# Patient Record
Sex: FEMALE | Race: WHITE | Employment: STUDENT | URBAN - METROPOLITAN AREA
[De-identification: names, ages, dates, MRNs, and addresses within clinical notes are randomized per-mention and may not be internally consistent; named-entity substitution may affect disease eponyms.]

---
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
## 2019-01-04 ENCOUNTER — OFFICE VISIT (OUTPATIENT)
Dept: PRIMARY CARE CLINIC | Age: 19
End: 2019-01-04

## 2019-01-04 VITALS
BODY MASS INDEX: 25.62 KG/M2 | OXYGEN SATURATION: 98 % | HEIGHT: 70 IN | WEIGHT: 179 LBS | SYSTOLIC BLOOD PRESSURE: 124 MMHG | HEART RATE: 80 BPM | RESPIRATION RATE: 18 BRPM | TEMPERATURE: 98.4 F | DIASTOLIC BLOOD PRESSURE: 77 MMHG

## 2019-01-04 DIAGNOSIS — H66.93 OTITIS OF BOTH EARS: ICD-10-CM

## 2019-01-04 DIAGNOSIS — J02.9 SORE THROAT: Primary | ICD-10-CM

## 2019-01-04 RX ORDER — HYDROCODONE BITARTRATE AND ACETAMINOPHEN 5; 325 MG/1; MG/1
TABLET ORAL
Refills: 0 | COMMUNITY
Start: 2018-09-28 | End: 2019-01-04 | Stop reason: ALTCHOICE

## 2019-01-04 RX ORDER — AMOXICILLIN 875 MG/1
TABLET, FILM COATED ORAL
Refills: 0 | COMMUNITY
Start: 2018-09-28 | End: 2019-01-04 | Stop reason: ALTCHOICE

## 2019-01-04 RX ORDER — AMOXICILLIN 500 MG/1
500 CAPSULE ORAL 2 TIMES DAILY
Qty: 20 CAP | Refills: 0 | Status: SHIPPED | OUTPATIENT
Start: 2019-01-04 | End: 2019-01-14

## 2019-01-04 NOTE — PATIENT INSTRUCTIONS
Tonsillitis: Care Instructions Your Care Instructions Tonsillitis is an infection of the tonsils that is caused by bacteria or a virus. The tonsils are in the back of the throat and are part of the immune system. Tonsillitis typically lasts from a few days up to a couple of weeks. Tonsillitis caused by a virus goes away on its own. Tonsillitis caused by the bacteria that causes strep throat is treated with antibiotics. You and your doctor may consider surgery to remove the tonsils (tonsillectomy) if you have serious complications or repeat infections. Follow-up care is a key part of your treatment and safety. Be sure to make and go to all appointments, and call your doctor if you are having problems. It's also a good idea to know your test results and keep a list of the medicines you take. How can you care for yourself at home? · If your doctor prescribed antibiotics, take them as directed. Do not stop taking them just because you feel better. You need to take the full course of antibiotics. · Gargle with warm salt water. This helps reduce swelling and relieve discomfort. Gargle once an hour with 1 teaspoon of salt mixed in 8 fluid ounces of warm water. · Take an over-the-counter pain medicine, such as acetaminophen (Tylenol), ibuprofen (Advil, Motrin), or naproxen (Aleve). Be safe with medicines. Read and follow all instructions on the label. No one younger than 20 should take aspirin. It has been linked to Reye syndrome, a serious illness. · Be careful when taking over-the-counter cold or flu medicines and Tylenol at the same time. Many of these medicines have acetaminophen, which is Tylenol. Read the labels to make sure that you are not taking more than the recommended dose. Too much acetaminophen (Tylenol) can be harmful. · Try an over-the-counter throat spray to relieve throat pain. · Drink plenty of fluids. Fluids may help soothe an irritated throat. Drink warm or cool liquids (whichever feels better). These include tea, soup, and juice. · Do not smoke, and avoid secondhand smoke. Smoking can make tonsillitis worse. If you need help quitting, talk to your doctor about stop-smoking programs and medicines. These can increase your chances of quitting for good. · Use a vaporizer or humidifier to add moisture to your bedroom. Follow the directions for cleaning the machine. When should you call for help? Call your doctor now or seek immediate medical care if: 
  · Your pain gets worse on one side of your throat.  
  · You have a new or higher fever.  
  · You notice changes in your voice.  
  · You have trouble opening your mouth.  
  · You have any trouble breathing.  
  · You have much more trouble swallowing.  
  · You have a fever with a stiff neck or a severe headache.  
  · You are sensitive to light or feel very sleepy or confused.  
 Watch closely for changes in your health, and be sure to contact your doctor if: 
  · You do not get better after 2 days. Where can you learn more? Go to http://ap-rudolph.info/. Enter W235 in the search box to learn more about \"Tonsillitis: Care Instructions. \" Current as of: March 28, 2018 Content Version: 11.8 © 5872-0860 [employer]. Care instructions adapted under license by [employer] (which disclaims liability or warranty for this information). If you have questions about a medical condition or this instruction, always ask your healthcare professional. [place] [address] any warranty or liability for your use of this information.

## 2019-01-04 NOTE — PROGRESS NOTES
Chief Complaint Patient presents with  Red Eye  
pt c/o L eye redness and irritation x 2-3 days also c/o cough and congestion x 2-3 days, states she has taken ibuprofen to help with discomfort. This note will not be viewable in [address]

## 2019-01-05 NOTE — PROGRESS NOTES
Subjective:  
  
Winifred Hughes is a 22 y.o. female who presents for possible ear infection. Symptoms include bilateral ear pain, bilateral ear drainage , plugged sensation in bilateral ear, congestion, coryza and sore throat. Onset of symptoms was 5 days ago, gradually worsening since that time. Associated symptoms include bilateral ear pressure/pain, achiness, congestion and coryza, which have been present for 5 days . She is drinking plenty of fluids. Past Medical History:  
Diagnosis Date  Alcohol abuse  Anxiety and depression Current Outpatient Medications Medication Sig Dispense Refill  amoxicillin (AMOXIL) 500 mg capsule Take 1 Cap by mouth two (2) times a day for 10 days. 20 Cap 0  
 PARoxetine (PAXIL) 20 mg tablet TAKE 1 TABLET BY MOUTH DAILY 30 Tab 2  JUNEL 1/20, 21, 1-20 mg-mcg tab TAKE 1 TABLET BY MOUTH DAILY 30 Dose Pack 3 No Known Allergies Social History Socioeconomic History  Marital status: SINGLE Spouse name: Not on file  Number of children: Not on file  Years of education: Not on file  Highest education level: Not on file Social Needs  Financial resource strain: Not on file  Food insecurity - worry: Not on file  Food insecurity - inability: Not on file  Transportation needs - medical: Not on file  Transportation needs - non-medical: Not on file Occupational History  Not on file Tobacco Use  Smoking status: Former Smoker Packs/day: 0.25 Types: Cigarettes Last attempt to quit: 6/26/2015 Years since quitting: 3.5  Smokeless tobacco: Never Used Substance and Sexual Activity  Alcohol use: Yes Comment: \"social\", constitutes abuse  Drug use: No  
 Sexual activity: Yes  
  Partners: Male Birth control/protection: Pill Other Topics Concern  Not on file Social History Narrative The patient is single, never , last bf was 3 mo ago.   The patient lives with a parent. The patient has no children. The patient does plan to return home upon discharge. HS grad. Ticket for underage etoh possession 2 weeks ago. The patient's source of income comes from family.  The patient has not been in an event described as horrible or outside the realm of ordinary life experience either currently or in the past. The patient has not been a victim of sexual/physical abuse.  The highest grade achieved is 12th. Review of Systems A comprehensive review of systems was negative except for that written in the HPI. Objective:  
 
Visit Vitals /77 (BP 1 Location: Left arm, BP Patient Position: Sitting) Pulse 80 Temp 98.4 °F (36.9 °C) (Oral) Resp 18 Ht 5' 10\" (1.778 m) Wt 179 lb (81.2 kg) SpO2 98% BMI 25.68 kg/m² General:  alert, cooperative, no distress, appears stated age Head:  NCAT w/o lesions or tenderness Eyes: negative, conjunctivae/corneas clear. PERRL, EOM's intact. Fundi benign Right Ear: right TM erythematous, bulging Left Ear: left TM erythematous, bulging Mouth:  Lips, mucosa, and tongue normal. Teeth and gums normal  
Neck: supple, symmetrical, trachea midline, no adenopathy, thyroid: not enlarged, symmetric, no tenderness/mass/nodules, no carotid bruit and no JVD. Lungs: clear to auscultation bilaterally Heart:  regular rate and rhythm, S1, S2 normal, no murmur, click, rub or gallop Skin: Normal.  
    
Assessment/Plan:  
 
Acute bilateral otitis media 1. Treatment:  Amoxicillin 2. OTC meds (acetaminophen, ibuprofen- doses discussed), fluids, rest, avoid carbonated/ alcoholic, and caffeinated beverages. 3.  Follow up with PCP in 1 week if not improving. ICD-10-CM ICD-9-CM 1. Sore throat J02.9 462 amoxicillin (AMOXIL) 500 mg capsule 2. Otitis of both ears H66.93 382.9 Saira Lester

## 2019-01-14 DIAGNOSIS — F41.9 ANXIETY: ICD-10-CM

## 2019-01-14 DIAGNOSIS — F32.A DEPRESSION, UNSPECIFIED DEPRESSION TYPE: ICD-10-CM

## 2019-01-14 RX ORDER — PAROXETINE HYDROCHLORIDE 20 MG/1
TABLET, FILM COATED ORAL
Qty: 30 TAB | Refills: 2 | Status: SHIPPED | OUTPATIENT
Start: 2019-01-14 | End: 2019-04-11 | Stop reason: ALTCHOICE

## 2019-01-14 NOTE — TELEPHONE ENCOUNTER
PCP: Glenn Rudd MD    Last appt: 2/26/2018  No future appointments.     Requested Prescriptions     Pending Prescriptions Disp Refills    PARoxetine (PAXIL) 20 mg tablet 30 Tab 2     Sig: TAKE 1 TABLET BY MOUTH DAILY

## 2019-04-11 ENCOUNTER — OFFICE VISIT (OUTPATIENT)
Dept: INTERNAL MEDICINE CLINIC | Age: 19
End: 2019-04-11

## 2019-04-11 VITALS
OXYGEN SATURATION: 99 % | WEIGHT: 179 LBS | RESPIRATION RATE: 18 BRPM | SYSTOLIC BLOOD PRESSURE: 134 MMHG | HEART RATE: 77 BPM | BODY MASS INDEX: 25.62 KG/M2 | TEMPERATURE: 97.8 F | HEIGHT: 70 IN | DIASTOLIC BLOOD PRESSURE: 84 MMHG

## 2019-04-11 DIAGNOSIS — F41.9 ANXIETY: Primary | ICD-10-CM

## 2019-04-11 DIAGNOSIS — K52.29 GASTROINTESTINAL FOOD ALLERGY: ICD-10-CM

## 2019-04-11 DIAGNOSIS — R10.84 GENERALIZED ABDOMINAL PAIN: ICD-10-CM

## 2019-04-11 DIAGNOSIS — L23.9 ALLERGIC CONTACT DERMATITIS, UNSPECIFIED TRIGGER: ICD-10-CM

## 2019-04-11 RX ORDER — HYDROCODONE BITARTRATE AND ACETAMINOPHEN 5; 325 MG/1; MG/1
TABLET ORAL
COMMUNITY
Start: 2019-04-09 | End: 2019-04-11 | Stop reason: ALTCHOICE

## 2019-04-11 RX ORDER — AMOXICILLIN 875 MG/1
TABLET, FILM COATED ORAL
COMMUNITY
Start: 2019-04-09 | End: 2019-11-21

## 2019-04-11 RX ORDER — PAROXETINE 30 MG/1
30 TABLET, FILM COATED ORAL DAILY
Qty: 30 TAB | Refills: 5 | Status: SHIPPED | OUTPATIENT
Start: 2019-04-11 | End: 2019-05-09 | Stop reason: SDUPTHER

## 2019-04-11 RX ORDER — CHLORHEXIDINE GLUCONATE 1.2 MG/ML
RINSE ORAL
COMMUNITY
Start: 2019-04-09 | End: 2019-11-21

## 2019-04-11 RX ORDER — TRIAMCINOLONE ACETONIDE 0.25 MG/G
CREAM TOPICAL 2 TIMES DAILY
Qty: 15 G | Refills: 0 | Status: SHIPPED | OUTPATIENT
Start: 2019-04-11 | End: 2019-11-21

## 2019-04-11 NOTE — PROGRESS NOTES
Reviewed record in preparation for visit and have obtained necessary documentation. Identified pt with two pt identifiers(name and ). Chief Complaint   Patient presents with    Medication Evaluation    Anxiety    Abdominal Pain       Health Maintenance Due   Topic Date Due    Pneumococcal Vaccine (1 of 1 - PPSV23) 2005    HPV Vaccine (1 - Female 3-dose series) 2014    DTaP/Tdap/Td  (2 - Td) 2017       Ms. Natividad Vega has a reminder for a \"due or due soon\" health maintenance. I have asked that she discuss this further with her primary care provider for follow-up on this health maintenance. Coordination of Care Questionnaire:  :     1) Have you been to an emergency room, urgent care clinic since your last visit? no   Hospitalized since your last visit? no             2) Have you seen or consulted any other health care providers outside of [address] [place] [address] since your last visit? no  (Include any pap smears or colon screenings in this section.)    3) In the event something were to happen to you and you were unable to speak on your behalf, do you have an Advance Directive/ Living Will in place stating your wishes? NO    Do you have an Advance Directive on file? no    4) Are you interested in receiving information on Advance Directives? NO    Patient is accompanied by boyfriend I have received verbal consent from [facility] to discuss any/all medical information while they are present in the room.

## 2019-04-11 NOTE — PROGRESS NOTES
Ms. Theresa Ferguson is presenting to follow up      CC:  Medication Evaluation; Anxiety; and Abdominal Pain       HPI:  She has a history of Anxiety and depression   Borderline personality personality  Patient felt paxil was very helpful initially but now wearing off. Denies side effects to the medication  -Sleeps well  Denies current anhedonia,and sad feelings     Patient had a psychiatrist in the past , has been through therapy \" that was not very helpful\"     Lower abdominal discomfort with loose stools after meals: she wonders if has food allergy. Gynecologist: none current  No pap smear  Sexually active 1 partner/ male      Social Hx  Aocohol intake: drinks once a week. Denies daily alcohol use  Denies drug use  Denies tobacco use  Quit tobacco  2015    Review of systems:  Constitutional: negative for fever, chills, weight loss, night sweats   10 systems reviewed and negative other than HPI      Past Medical History:   Diagnosis Date    Alcohol abuse     Anxiety and depression         History reviewed. No pertinent surgical history. No Known Allergies    Current Outpatient Medications on File Prior to Visit   Medication Sig Dispense Refill    amoxicillin (AMOXIL) 875 mg tablet       chlorhexidine (PERIDEX) 0.12 % solution       HYDROcodone-acetaminophen (NORCO) 5-325 mg per tablet       PARoxetine (PAXIL) 20 mg tablet TAKE 1 TABLET BY MOUTH DAILY 30 Tab 2    JUNEL 1/20, 21, 1-20 mg-mcg tab TAKE 1 TABLET BY MOUTH DAILY 30 Dose Pack 3     No current facility-administered medications on file prior to visit. family history includes Alcohol abuse in her father; Depression in her father and mother; Diabetes in her maternal grandmother; Heart Disease in her maternal grandfather and maternal grandmother.     Social History     Socioeconomic History    Marital status: SINGLE     Spouse name: Not on file    Number of children: Not on file    Years of education: Not on file    Highest education level: Not on file   Occupational History    Not on file   Social Needs    Financial resource strain: Not on file    Food insecurity:     Worry: Not on file     Inability: Not on file    Transportation needs:     Medical: Not on file     Non-medical: Not on file   Tobacco Use    Smoking status: Former Smoker     Packs/day: 0.25     Types: Cigarettes     Last attempt to quit: 6/26/2015     Years since quitting: 3.7    Smokeless tobacco: Never Used   Substance and Sexual Activity    Alcohol use: Yes     Comment: \"social\", constitutes abuse    Drug use: No    Sexual activity: Yes     Partners: Male     Birth control/protection: Pill   Lifestyle    Physical activity:     Days per week: Not on file     Minutes per session: Not on file    Stress: Not on file   Relationships    Social connections:     Talks on phone: Not on file     Gets together: Not on file     Attends Am[employer] service: Not on file     Active member of club or organization: Not on file     Attends meetings of clubs or organizations: Not on file     Relationship status: Not on file    Intimate partner violence:     Fear of current or ex partner: Not on file     Emotionally abused: Not on file     Physically abused: Not on file     Forced sexual activity: Not on file   Other Topics Concern    Not on file   Social History Narrative    The patient is single, never , last bf was 3 mo ago. The patient lives with a parent. The patient has no children. The patient does plan to return home upon discharge. HS grad. Ticket for underage etoh possession 2 weeks ago. The patient's source of income comes from family.  The patient has not been in an event described as horrible or outside the realm of ordinary life experience either currently or in the past. The patient has not been a victim of sexual/physical abuse.  The highest grade achieved is 12th.         Visit Vitals  /84 (BP 1 Location: Right arm, BP Patient Position: Sitting)   Pulse 77   Temp 97.8 °F (36.6 °C) (Oral)   Resp 18   Ht 5' 10\" (1.778 m)   Wt 179 lb (81.2 kg)   SpO2 99%   BMI 25.68 kg/m²     General:  Well appearing female no acute distress  HEENT:   PERRL,normal conjunctiva. External ear and canals normal, TMs normal.  Hearing normal to voice. Nose without edema or discharge, normal septum. Lips, teeth, gums normal.  Oropharynx: no erythema, no exudates, no lesions, normal tongue. Neck:  Supple. Thyroid normal size, nontender, without nodules. No carotid bruit. No masses or lymphadenopathy  Respiratory: no respiratory distress,  no wheezing, no rhonchi, no rales. No chest wall tenderness. Cardiovascular:  RRR, normal S1S2, no murmur. Gastrointestinal: normal bowel sounds, soft, nontender, without masses. No hepatosplenomegaly. Extremities +2 pulses, no edema, normal sensation   Musculoskeletal:  Normal gait. Normal digits and nails. Normal strength and tone, no atrophy, and no abnormal movement. Skin: Has tattoos  Neuro:  A and OX4, fluent speech, cranial nerves normal 2-12. Sensation normal to light touch.   DTR symmetrical  Psych:  Normal affect      Lab Results   Component Value Date/Time    WBC 7.0 02/26/2018 10:34 AM    HGB 12.1 02/26/2018 10:34 AM    HCT 37.2 02/26/2018 10:34 AM    PLATELET 832 10/12/5020 10:34 AM    MCV 98 (H) 02/26/2018 10:34 AM     Lab Results   Component Value Date/Time    Sodium 140 02/26/2018 10:34 AM    Potassium 4.3 02/26/2018 10:34 AM    Chloride 103 02/26/2018 10:34 AM    CO2 20 02/26/2018 10:34 AM    Anion gap 3 (L) 07/30/2017 11:30 PM    Glucose 85 02/26/2018 10:34 AM    BUN 8 02/26/2018 10:34 AM    Creatinine 0.74 02/26/2018 10:34 AM    BUN/Creatinine ratio 11 02/26/2018 10:34 AM    GFR est  02/26/2018 10:34 AM    GFR est non- 02/26/2018 10:34 AM    Calcium 9.1 02/26/2018 10:34 AM     Lab Results   Component Value Date/Time    Cholesterol, total 133 08/01/2017 04:47 AM    HDL Cholesterol 48 08/01/2017 04:47 AM    LDL, calculated 57.6 08/01/2017 04:47 AM    VLDL, calculated 27.4 08/01/2017 04:47 AM    Triglyceride 137 08/01/2017 04:47 AM    CHOL/HDL Ratio 2.8 08/01/2017 04:47 AM     Lab Results   Component Value Date/Time    TSH 1.840 02/26/2018 10:34 AM     No results found for: HBA1C, HGBE8, LHT8SIUH, YBN6NBRF, OKG2TOVL  No results found for: VITD3, XQVID2, XQVID3, XQVID, VD3RIA                Assessment and Plan:     1. Anxiety  2. Depression, unspecified depression type  3. Borderline personality disorder  No Suicidal ideation  Initially felt paxil was very helpful now effects are not as great, will increase to paxil 30   -  Start PARoxetine (PAXIL) 20 mg tablet; Take 1 Tab by mouth daily. Dispense: 30 Tab; Refill: 2    4. Allergic contact dermatitis, unspecified trigger - advised to avoid jewelry watches on irritated area  - triamcinolone acetonide (KENALOG) 0.025 % topical cream; Apply  to affected area two (2) times a day. use thin layer  Dispense: 15 g; Refill: 0    5. Possible  Gastrointestinal food allergy  - CELIAC ANTIBODY PROFILE  - Jessica See MD

## 2019-04-11 NOTE — PATIENT INSTRUCTIONS
Neck: Exercises  Your Care Instructions  Here are some examples of typical rehabilitation exercises for your condition. Start each exercise slowly. Ease off the exercise if you start to have pain. Your doctor or physical therapist will tell you when you can start these exercises and which ones will work best for you. How to do the exercises  Neck stretch    1. This stretch works best if you keep your shoulder down as you lean away from it. To help you remember to do this, start by relaxing your shoulders and lightly holding on to your thighs or your chair. 2. Tilt your head toward your shoulder and hold for 15 to 30 seconds. Let the weight of your head stretch your muscles. 3. If you would like a little added stretch, use your hand to gently and steadily pull your head toward your shoulder. For example, keeping your right shoulder down, lean your head to the left. 4. Repeat 2 to 4 times toward each shoulder. Diagonal neck stretch    1. Turn your head slightly toward the direction you will be stretching, and tilt your head diagonally toward your chest and hold for 15 to 30 seconds. 2. If you would like a little added stretch, use your hand to gently and steadily pull your head forward on the diagonal.  3. Repeat 2 to 4 times toward each side. Dorsal glide stretch    1. Sit or stand tall and look straight ahead. 2. Slowly tuck your chin as you glide your head backward over your body  3. Hold for a count of 6, and then relax for up to 10 seconds. 4. Repeat 8 to 12 times. Chest and shoulder stretch    1. Sit or stand tall and glide your head backward as in the dorsal glide stretch. 2. Raise both arms so that your hands are next to your ears. 3. Take a deep breath, and as you breathe out, lower your elbows down and behind your back. You will feel your shoulder blades slide down and together, and at the same time you will feel a stretch across your chest and the front of your shoulders.   4. Hold for about 6 seconds, and then relax for up to 10 seconds. 5. Repeat 8 to 12 times. Strengthening: Hands on head    1. Move your head backward, forward, and side to side against gentle pressure from your hands, holding each position for about 6 seconds. 2. Repeat 8 to 12 times. Follow-up care is a key part of your treatment and safety. Be sure to make and go to all appointments, and call your doctor if you are having problems. It's also a good idea to know your test results and keep a list of the medicines you take. Where can you learn more? Go to http://ap-rudolph.info/. Enter P975 in the search box to learn more about \"Neck: Exercises. \"  Current as of: September 20, 2018  Content Version: 11.9  © 1703-8667 [employer]. Care instructions adapted under license by [employer] (which disclaims liability or warranty for this information).  If you have questions about a medical condition or this instruction, always ask your healthcare professional. [address] any warranty or liability for your use of this information.    ---------------------------------------------------  Cut out dairy for two weeks and see if your symptoms go away

## 2019-04-16 LAB
ALBUMIN SERPL-MCNC: 4 G/DL (ref 3.5–5.5)
ALBUMIN/GLOB SERPL: 1.4 {RATIO} (ref 1.2–2.2)
ALP SERPL-CCNC: 66 IU/L (ref 39–117)
ALT SERPL-CCNC: 11 IU/L (ref 0–32)
AST SERPL-CCNC: 17 IU/L (ref 0–40)
BASOPHILS # BLD AUTO: 0 X10E3/UL (ref 0–0.2)
BASOPHILS NFR BLD AUTO: 0 %
BILIRUB SERPL-MCNC: 0.4 MG/DL (ref 0–1.2)
BUN SERPL-MCNC: 7 MG/DL (ref 6–20)
BUN/CREAT SERPL: 9 (ref 9–23)
CALCIUM SERPL-MCNC: 8.9 MG/DL (ref 8.7–10.2)
CHLORIDE SERPL-SCNC: 103 MMOL/L (ref 96–106)
CLAM IGE QN: <0.1 KU/L
CO2 SERPL-SCNC: 19 MMOL/L (ref 20–29)
CODFISH IGE QN: <0.1 KU/L
CORN IGE QN: <0.1 KU/L
COW MILK IGE QN: 0.19 KU/L
CREAT SERPL-MCNC: 0.78 MG/DL (ref 0.57–1)
EGG WHITE IGE QN: <0.1 KU/L
EOSINOPHIL # BLD AUTO: 0.2 X10E3/UL (ref 0–0.4)
EOSINOPHIL NFR BLD AUTO: 3 %
ERYTHROCYTE [DISTWIDTH] IN BLOOD BY AUTOMATED COUNT: 12.7 % (ref 12.3–15.4)
GLIADIN PEPTIDE IGA SER-ACNC: 3 UNITS (ref 0–19)
GLIADIN PEPTIDE IGG SER-ACNC: 3 UNITS (ref 0–19)
GLOBULIN SER CALC-MCNC: 2.9 G/DL (ref 1.5–4.5)
GLUCOSE SERPL-MCNC: 78 MG/DL (ref 65–99)
HCT VFR BLD AUTO: 39.8 % (ref 34–46.6)
HGB BLD-MCNC: 13 G/DL (ref 11.1–15.9)
IGA SERPL-MCNC: 189 MG/DL (ref 87–352)
IMM GRANULOCYTES # BLD AUTO: 0 X10E3/UL (ref 0–0.1)
IMM GRANULOCYTES NFR BLD AUTO: 0 %
LYMPHOCYTES # BLD AUTO: 2 X10E3/UL (ref 0.7–3.1)
LYMPHOCYTES NFR BLD AUTO: 24 %
Lab: ABNORMAL
MCH RBC QN AUTO: 31.8 PG (ref 26.6–33)
MCHC RBC AUTO-ENTMCNC: 32.7 G/DL (ref 31.5–35.7)
MCV RBC AUTO: 97 FL (ref 79–97)
MONOCYTES # BLD AUTO: 0.6 X10E3/UL (ref 0.1–0.9)
MONOCYTES NFR BLD AUTO: 7 %
NEUTROPHILS # BLD AUTO: 5.5 X10E3/UL (ref 1.4–7)
NEUTROPHILS NFR BLD AUTO: 66 %
PEANUT IGE QN: <0.1 KU/L
PLATELET # BLD AUTO: 244 X10E3/UL (ref 150–379)
POTASSIUM SERPL-SCNC: 4.1 MMOL/L (ref 3.5–5.2)
PROT SERPL-MCNC: 6.9 G/DL (ref 6–8.5)
RBC # BLD AUTO: 4.09 X10E6/UL (ref 3.77–5.28)
SCALLOP IGE QN: <0.1 KU/L
SESAME SEED IGE QN: <0.1 KU/L
SHRIMP IGE QN: <0.1 KU/L
SODIUM SERPL-SCNC: 137 MMOL/L (ref 134–144)
SOYBEAN IGE QN: <0.1 KU/L
TTG IGA SER-ACNC: <2 U/ML (ref 0–3)
TTG IGG SER-ACNC: <2 U/ML (ref 0–5)
WALNUT IGE QN: <0.1 KU/L
WBC # BLD AUTO: 8.4 X10E3/UL (ref 3.4–10.8)
WHEAT IGE QN: <0.1 KU/L

## 2019-04-17 NOTE — PROGRESS NOTES
Normal blood count  Normal kidney and liver function   Testing for celiac disease is negative  Mild allergy to milk. Avoid milk products.  Can drink almond milk instead

## 2019-04-29 DIAGNOSIS — N94.6 MENSES PAINFUL: ICD-10-CM

## 2019-04-29 RX ORDER — NORETHINDRONE ACETATE AND ETHINYL ESTRADIOL 1; 20 MG/1; UG/1
TABLET ORAL
Qty: 30 DOSE PACK | Refills: 3 | Status: SHIPPED | OUTPATIENT
Start: 2019-04-29 | End: 2019-08-01 | Stop reason: SDUPTHER

## 2019-04-29 NOTE — TELEPHONE ENCOUNTER
Pt would like to have a refill on Rx \"Junel\" (Family planning ) and send to the [facility]. Pt would like the nurse to call back when prescription request has been sent.  Pharmacy callback[de-identified] [phone] Pt's callback: [phone]       Message received & copied from [facility]

## 2019-04-29 NOTE — TELEPHONE ENCOUNTER
PCP: Rishabh Daniels MD    Last appt: 4/11/2019  No future appointments.     Requested Prescriptions     Pending Prescriptions Disp Refills    JUNEL 1/20, 21, 1-20 mg-mcg tab 30 Dose Pack 3     Sig: TAKE 1 TABLET BY MOUTH DAILY

## 2019-05-09 DIAGNOSIS — F41.9 ANXIETY: ICD-10-CM

## 2019-05-09 RX ORDER — PAROXETINE 30 MG/1
30 TABLET, FILM COATED ORAL DAILY
Qty: 30 TAB | Refills: 5 | Status: SHIPPED | OUTPATIENT
Start: 2019-05-09 | End: 2019-06-27 | Stop reason: SDUPTHER

## 2019-05-09 NOTE — TELEPHONE ENCOUNTER
PCP: Karina Pyle MD    Last appt: 4/11/2019  No future appointments. Requested Prescriptions     Pending Prescriptions Disp Refills    PARoxetine (PAXIL) 30 mg tablet 30 Tab 5     Sig: Take 1 Tab by mouth daily.

## 2019-06-27 DIAGNOSIS — F41.9 ANXIETY: ICD-10-CM

## 2019-06-27 NOTE — TELEPHONE ENCOUNTER
Requested Prescriptions     Pending Prescriptions Disp Refills    PARoxetine (PAXIL) 30 mg tablet 30 Tab 5     Sig: Take 1 Tab by mouth daily. PCP: Raul Sharma MD    Last appt: 4/11/2019  No future appointments. Requested Prescriptions     Pending Prescriptions Disp Refills    PARoxetine (PAXIL) 30 mg tablet 30 Tab 5     Sig: Take 1 Tab by mouth daily.

## 2019-06-28 DIAGNOSIS — F41.9 ANXIETY: ICD-10-CM

## 2019-06-28 RX ORDER — PAROXETINE 30 MG/1
30 TABLET, FILM COATED ORAL DAILY
Qty: 90 TAB | Refills: 0 | Status: SHIPPED | OUTPATIENT
Start: 2019-06-28 | End: 2019-09-28 | Stop reason: SDUPTHER

## 2019-06-28 RX ORDER — PAROXETINE 30 MG/1
30 TABLET, FILM COATED ORAL DAILY
Qty: 30 TAB | Refills: 5 | Status: SHIPPED | OUTPATIENT
Start: 2019-06-28 | End: 2019-06-28 | Stop reason: SDUPTHER

## 2019-08-01 DIAGNOSIS — N94.6 MENSES PAINFUL: ICD-10-CM

## 2019-08-01 RX ORDER — NORETHINDRONE ACETATE AND ETHINYL ESTRADIOL 1; 20 MG/1; UG/1
TABLET ORAL
Qty: 1 DOSE PACK | Refills: 3 | Status: SHIPPED | OUTPATIENT
Start: 2019-08-01 | End: 2019-11-27 | Stop reason: SDUPTHER

## 2019-09-28 DIAGNOSIS — F41.9 ANXIETY: ICD-10-CM

## 2019-09-28 RX ORDER — PAROXETINE 30 MG/1
TABLET, FILM COATED ORAL
Qty: 90 TAB | Refills: 0 | Status: SHIPPED | OUTPATIENT
Start: 2019-09-28 | End: 2020-04-20

## 2019-11-21 ENCOUNTER — OFFICE VISIT (OUTPATIENT)
Dept: URGENT CARE | Age: 19
End: 2019-11-21

## 2019-11-21 VITALS
BODY MASS INDEX: 23.62 KG/M2 | OXYGEN SATURATION: 99 % | SYSTOLIC BLOOD PRESSURE: 142 MMHG | TEMPERATURE: 98.8 F | RESPIRATION RATE: 14 BRPM | DIASTOLIC BLOOD PRESSURE: 67 MMHG | HEART RATE: 86 BPM | HEIGHT: 70 IN | WEIGHT: 165 LBS

## 2019-11-21 DIAGNOSIS — J03.90 ACUTE TONSILLITIS, UNSPECIFIED ETIOLOGY: Primary | ICD-10-CM

## 2019-11-21 DIAGNOSIS — J02.9 SORE THROAT: ICD-10-CM

## 2019-11-21 LAB
S PYO AG THROAT QL: NEGATIVE
VALID INTERNAL CONTROL?: YES

## 2019-11-21 RX ORDER — PENICILLIN V POTASSIUM 500 MG/1
500 TABLET, FILM COATED ORAL 2 TIMES DAILY
Qty: 10 TAB | Refills: 0 | Status: SHIPPED | OUTPATIENT
Start: 2019-11-21 | End: 2020-02-25

## 2019-11-21 NOTE — PROGRESS NOTES
21 yo female here for sore throat  Onset 3-4 days ago with associated fever  Tolerating food and fluids ok. Occasional nausea without vomiting. 0/10 abdominal pain. Has tried motrin with some temporary relief. No significant cough and no rashes  Overall not improving.  + sick contact with similar symptmos             Past Medical History:   Diagnosis Date    Alcohol abuse     Anxiety and depression         History reviewed. No pertinent surgical history.       Family History   Problem Relation Age of Onset    Diabetes Maternal Grandmother     Heart Disease Maternal Grandmother     Heart Disease Maternal Grandfather     Depression Mother     Depression Father     Alcohol abuse Father         Social History     Socioeconomic History    Marital status: SINGLE     Spouse name: Not on file    Number of children: Not on file    Years of education: Not on file    Highest education level: Not on file   Occupational History    Not on file   Social Needs    Financial resource strain: Not on file    Food insecurity:     Worry: Not on file     Inability: Not on file    Transportation needs:     Medical: Not on file     Non-medical: Not on file   Tobacco Use    Smoking status: Former Smoker     Packs/day: 0.25     Types: Cigarettes     Last attempt to quit: 2015     Years since quittin.4    Smokeless tobacco: Never Used   Substance and Sexual Activity    Alcohol use: Yes     Comment: \"social\", constitutes abuse    Drug use: No    Sexual activity: Yes     Partners: Male     Birth control/protection: Pill   Lifestyle    Physical activity:     Days per week: Not on file     Minutes per session: Not on file    Stress: Not on file   Relationships    Social connections:     Talks on phone: Not on file     Gets together: Not on file     Attends Tenriism service: Not on file     Active member of club or organization: Not on file     Attends meetings of clubs or organizations: Not on file Relationship status: Not on file    Intimate partner violence:     Fear of current or ex partner: Not on file     Emotionally abused: Not on file     Physically abused: Not on file     Forced sexual activity: Not on file   Other Topics Concern    Not on file   Social History Narrative    The patient is single, never , last bf was 3 mo ago. The patient lives with a parent. The patient has no children. The patient does plan to return home upon discharge. HS grad. Ticket for underage etoh possession 2 weeks ago. The patient's source of income comes from family.  The patient has not been in an event described as horrible or outside the realm of ordinary life experience either currently or in the past. The patient has not been a victim of sexual/physical abuse.  The highest grade achieved is 12th. ALLERGIES: Patient has no known allergies. Review of Systems   Constitutional: Negative for fatigue. Respiratory: Negative for shortness of breath. Skin: Negative for rash. Neurological: Negative for dizziness. All other systems reviewed and are negative. Vitals:    11/21/19 1213   BP: 142/67   Pulse: 86   Resp: 14   Temp: 98.8 °F (37.1 °C)   SpO2: 99%   Weight: 165 lb (74.8 kg)   Height: 5' 10\" (1.778 m)       Physical Exam  Vitals signs and nursing note reviewed. Constitutional:       General: She is not in acute distress. Appearance: Normal appearance. She is not diaphoretic. Comments: Non-toxic appearing, well hydrated   HENT:      Right Ear: Tympanic membrane normal.      Left Ear: Tympanic membrane normal.      Ears:      Comments: Tonsils 2+ erythematous bilat. Uvula midline. Mucus membranes moist. No oral lesions. Nose: No congestion or rhinorrhea. Mouth/Throat:      Mouth: Mucous membranes are moist.      Pharynx: Posterior oropharyngeal erythema present. No oropharyngeal exudate. Eyes:      General: No scleral icterus.      Conjunctiva/sclera: Conjunctivae normal.      Pupils: Pupils are equal, round, and reactive to light. Neck:      Musculoskeletal: Neck supple. No neck rigidity. Cardiovascular:      Rate and Rhythm: Normal rate and regular rhythm. Pulses: Normal pulses. Heart sounds: Normal heart sounds. No murmur. No friction rub. No gallop. Pulmonary:      Effort: Pulmonary effort is normal.      Breath sounds: Normal breath sounds. No rhonchi. Lymphadenopathy:      Cervical: Cervical adenopathy present. Skin:     General: Skin is warm and dry. Capillary Refill: Capillary refill takes less than 2 seconds. Coloration: Skin is not pale. Findings: No erythema or rash. Neurological:      Mental Status: She is alert and oriented to person, place, and time. Psychiatric:         Behavior: Behavior normal.         Thought Content: Thought content normal.         Judgment: Judgment normal.         [facility]     Differential Diagnosis; Clinical Impression; Plan:       CLINICAL IMPRESSION:  (J03.90) Acute tonsillitis, unspecified etiology  (primary encounter diagnosis)  (J02.9) Sore throat    Orders Placed This Encounter      penicillin v potassium (VEETID) 500 mg tablet          Sig: Take 1 Tab by mouth two (2) times a day. Dispense:  10 Tab          Refill:  0      Plan:  Rapid strep negative  Will treat for possible bacterial tonsillitis with penicillin per above orders  Maintain adequate fluid intake  May continue motrin for fever, pain, aches  Review handouts  Note provided for work per request.      We have reviewed concerning signs/symptoms, normal vs abnormal progression of medical condition and when to seek immediate medical attention. Schedule with PCP or Urgent Care immediately for worsening or new symptoms. See your PCP if there is not at least some improvement in symptoms within the next 5-7 days. You should see your PCP for updates on your routine health maintenance.              Procedures

## 2019-11-21 NOTE — LETTER
NOTIFICATION RETURN TO WORK / SCHOOL 
 
11/21/2019 12:32 PM 
 
Ms. Rupa Barber [facility] [id number] To Whom It May Concern: 
 
Rupa Barber is currently under the care of [facility]. She will return to work/school on: 11/22 OR 11/23/2019 If there are questions or concerns please have the patient contact our office. Sincerely, [facility] PROVIDER Launie Fleischer, NP

## 2019-11-21 NOTE — PATIENT INSTRUCTIONS
Follow up with PCP without improvement over next 5-7 days sooner/immediately for new or worsening       Tonsillitis: Care Instructions  Your Care Instructions    Tonsillitis is an infection of the tonsils that is caused by bacteria or a virus. The tonsils are in the back of the throat and are part of the immune system. Tonsillitis typically lasts from a few days up to a couple of weeks. Tonsillitis caused by a virus goes away on its own. Tonsillitis caused by the bacteria that causes strep throat is treated with antibiotics. You and your doctor may consider surgery to remove the tonsils (tonsillectomy) if you have serious complications or repeat infections. Follow-up care is a key part of your treatment and safety. Be sure to make and go to all appointments, and call your doctor if you are having problems. It's also a good idea to know your test results and keep a list of the medicines you take. How can you care for yourself at home? · If your doctor prescribed antibiotics, take them as directed. Do not stop taking them just because you feel better. You need to take the full course of antibiotics. · Gargle with warm salt water. This helps reduce swelling and relieve discomfort. Gargle once an hour with 1 teaspoon of salt mixed in 8 fluid ounces of warm water. · Take an over-the-counter pain medicine, such as acetaminophen (Tylenol), ibuprofen (Advil, Motrin), or naproxen (Aleve). Be safe with medicines. Read and follow all instructions on the label. No one younger than 20 should take aspirin. It has been linked to Reye syndrome, a serious illness. · Be careful when taking over-the-counter cold or flu medicines and Tylenol at the same time. Many of these medicines have acetaminophen, which is Tylenol. Read the labels to make sure that you are not taking more than the recommended dose. Too much acetaminophen (Tylenol) can be harmful. · Try an over-the-counter throat spray to relieve throat pain.   · Drink plenty of fluids. Fluids may help soothe an irritated throat. Drink warm or cool liquids (whichever feels better). These include tea, soup, and juice. · Do not smoke, and avoid secondhand smoke. Smoking can make tonsillitis worse. If you need help quitting, talk to your doctor about stop-smoking programs and medicines. These can increase your chances of quitting for good. · Use a vaporizer or humidifier to add moisture to your bedroom. Follow the directions for cleaning the machine. When should you call for help? Call your doctor now or seek immediate medical care if:    · Your pain gets worse on one side of your throat.     · You have a new or higher fever.     · You notice changes in your voice.     · You have trouble opening your mouth.     · You have any trouble breathing.     · You have much more trouble swallowing.     · You have a fever with a stiff neck or a severe headache.     · You are sensitive to light or feel very sleepy or confused.    Watch closely for changes in your health, and be sure to contact your doctor if:    · You do not get better after 2 days. Where can you learn more? Go to http://ap-rudolph.info/. Enter W889 in the search box to learn more about \"Tonsillitis: Care Instructions. \"  Current as of: October 21, 2018  Content Version: 12.2  © 1072-0283 [employer]. Care instructions adapted under license by [email or web address] (which disclaims liability or warranty for this information). If you have questions about a medical condition or this instruction, always ask your healthcare professional. [place] [address] any warranty or liability for your use of this information.

## 2019-11-27 DIAGNOSIS — N94.6 MENSES PAINFUL: ICD-10-CM

## 2019-11-29 RX ORDER — NORETHINDRONE ACETATE AND ETHINYL ESTRADIOL 1; 20 MG/1; UG/1
TABLET ORAL
Qty: 21 DOSE PACK | Refills: 3 | Status: SHIPPED | OUTPATIENT
Start: 2019-11-29 | End: 2020-12-15 | Stop reason: SDUPTHER

## 2020-02-25 ENCOUNTER — OFFICE VISIT (OUTPATIENT)
Dept: URGENT CARE | Age: 20
End: 2020-02-25

## 2020-02-25 VITALS
TEMPERATURE: 98.2 F | RESPIRATION RATE: 16 BRPM | DIASTOLIC BLOOD PRESSURE: 56 MMHG | BODY MASS INDEX: 23.48 KG/M2 | WEIGHT: 164 LBS | SYSTOLIC BLOOD PRESSURE: 112 MMHG | HEART RATE: 92 BPM | OXYGEN SATURATION: 98 % | HEIGHT: 70 IN

## 2020-02-25 DIAGNOSIS — S61.219A LACERATION OF MULTIPLE SITES OF RIGHT HAND AND FINGERS, INITIAL ENCOUNTER: Primary | ICD-10-CM

## 2020-02-25 DIAGNOSIS — S61.411A LACERATION OF MULTIPLE SITES OF RIGHT HAND AND FINGERS, INITIAL ENCOUNTER: Primary | ICD-10-CM

## 2020-02-25 RX ORDER — MUPIROCIN 20 MG/G
OINTMENT TOPICAL DAILY
Qty: 30 G | Refills: 0 | Status: SHIPPED | OUTPATIENT
Start: 2020-02-25

## 2020-02-25 NOTE — PROGRESS NOTES
Cut right 3rd and 4th finger accidentally on piece of glass 2 days ago  Small cuts < 1 inch on palmar 3rd and 4th finger  Stopped bleeding  Washed well. Denies sensation of foreign body  Full AROM of fingers without pain  Up to date on tetanus  No redness or swelling, weakness, numbness or tingling. Improving but. Requesting work note as she had to call out on day of injury. Past Medical History:   Diagnosis Date    Alcohol abuse     Anxiety and depression         History reviewed. No pertinent surgical history.       Family History   Problem Relation Age of Onset    Diabetes Maternal Grandmother     Heart Disease Maternal Grandmother     Heart Disease Maternal Grandfather     Depression Mother     Depression Father     Alcohol abuse Father         Social History     Socioeconomic History    Marital status: SINGLE     Spouse name: Not on file    Number of children: Not on file    Years of education: Not on file    Highest education level: Not on file   Occupational History    Not on file   Social Needs    Financial resource strain: Not on file    Food insecurity:     Worry: Not on file     Inability: Not on file    Transportation needs:     Medical: Not on file     Non-medical: Not on file   Tobacco Use    Smoking status: Former Smoker     Packs/day: 0.25     Types: Cigarettes     Last attempt to quit: 2015     Years since quittin.6    Smokeless tobacco: Never Used   Substance and Sexual Activity    Alcohol use: Yes     Comment: \"social\", constitutes abuse    Drug use: No    Sexual activity: Yes     Partners: Male     Birth control/protection: Pill   Lifestyle    Physical activity:     Days per week: Not on file     Minutes per session: Not on file    Stress: Not on file   Relationships    Social connections:     Talks on phone: Not on file     Gets together: Not on file     Attends Hinduism service: Not on file     Active member of club or organization: Not on file Attends meetings of clubs or organizations: Not on file     Relationship status: Not on file    Intimate partner violence:     Fear of current or ex partner: Not on file     Emotionally abused: Not on file     Physically abused: Not on file     Forced sexual activity: Not on file   Other Topics Concern    Not on file   Social History Narrative    The patient is single, never , last bf was 3 mo ago. The patient lives with a parent. The patient has no children. The patient does plan to return home upon discharge. HS grad. Ticket for underage etoh possession 2 weeks ago. The patient's source of income comes from family.  The patient has not been in an event described as horrible or outside the realm of ordinary life experience either currently or in the past. The patient has not been a victim of sexual/physical abuse.  The highest grade achieved is 12th. ALLERGIES: Patient has no known allergies. Review of Systems   All other systems reviewed and are negative. Vitals:    02/25/20 0959   BP: 112/56   Pulse: 92   Resp: 16   Temp: 98.2 °F (36.8 °C)   SpO2: 98%   Weight: 164 lb (74.4 kg)   Height: 5' 10\" (1.778 m)       Physical Exam  Constitutional:       Appearance: Normal appearance. HENT:      Head: Normocephalic and atraumatic. Eyes:      Extraocular Movements: Extraocular movements intact. Pupils: Pupils are equal, round, and reactive to light. Cardiovascular:      Rate and Rhythm: Normal rate. Pulmonary:      Effort: Pulmonary effort is normal.   Musculoskeletal:        Hands:    Neurological:      Mental Status: She is alert. MDM     Differential Diagnosis; Clinical Impression; Plan:       CLINICAL IMPRESSION:  (O53.118I,  E73.578E) Laceration of multiple sites of right hand and fingers, initial encounter  (primary encounter diagnosis)    Orders Placed This Encounter      mupirocin (BACTROBAN) 2 % ointment          Sig: Apply  to affected area daily. Dispense:  30 g          Refill:  0      Plan:  Cut healing well  No indication for sutures/outside of window  No infection but may apply topical bactroban for next 5-7 days  Keep clean and covered  Review handouts   tdap up to date    We have reviewed concerning signs/symptoms, normal vs abnormal progression of medical condition and when to seek immediate medical attention. Schedule with PCP or Urgent Care immediately for worsening or new symptoms. You should see your PCP for updates on your routine health maintenance.              Procedures

## 2020-02-25 NOTE — LETTER
NOTIFICATION RETURN TO WORK / SCHOOL 
 
2/25/2020 10:13 AM 
 
Ms. Clarice Azevedo [facility] [address] To Whom It May Concern: 
 
Clarice Azevedo is currently under the care of [address] [place] [address]. She will return to work/school on: 02/26/2020 If there are questions or concerns please have the patient contact our office. Sincerely, [facility] PROVIDER America Khalil NP

## 2020-02-25 NOTE — PATIENT INSTRUCTIONS
Return immediately for new, worsening or changes        Cuts: Care Instructions  Your Care Instructions  A cut can happen anywhere on your body. Stitches, staples, skin adhesives, or pieces of tape called Steri-Strips are sometimes used to keep the edges of a cut together and help it heal. Steri-Strips can be used by themselves or with stitches or staples. Sometimes cuts are left open. If the cut went deep and through the skin, the doctor may have closed the cut in two layers. A deeper layer of stitches brings the deep part of the cut together. These stitches will dissolve and don't need to be removed. The upper layer closure, which could be stitches, staples, Steri-Strips, or adhesive, is what you see on the cut. A cut is often covered by a bandage. The doctor has checked you carefully, but problems can develop later. If you notice any problems or new symptoms, get medical treatment right away. Follow-up care is a key part of your treatment and safety. Be sure to make and go to all appointments, and call your doctor if you are having problems. It's also a good idea to know your test results and keep a list of the medicines you take. How can you care for yourself at home? If a cut is open or closed  · Prop up the sore area on a pillow anytime you sit or lie down during the next 3 days. Try to keep it above the level of your heart. This will help reduce swelling. · Keep the cut dry for the first 24 to 48 hours. After this, you can shower if your doctor okays it. Pat the cut dry. · Don't soak the cut, such as in a bathtub. Your doctor will tell you when it's safe to get the cut wet. · After the first 24 to 48 hours, clean the cut with soap and water 2 times a day unless your doctor gives you different instructions. ? Don't use hydrogen peroxide or alcohol, which can slow healing. ? You may cover the cut with a thin layer of petroleum jelly and a nonstick bandage.   ? If the doctor put a bandage over the cut, put on a new bandage after cleaning the cut or if the bandage gets wet or dirty. · Avoid any activity that could cause your cut to reopen. · Be safe with medicines. Read and follow all instructions on the label. ? If the doctor gave you a prescription medicine for pain, take it as prescribed. ? If you are not taking a prescription pain medicine, ask your doctor if you can take an over-the-counter medicine. If the cut is closed with stitches, staples, or Steri-Strips  · Follow the above instructions for open or closed cuts. · Do not remove the stitches or staples on your own. Your doctor will tell you when to come back to have the stitches or staples removed. · Leave Steri-Strips on until they fall off. If the cut is closed with a skin adhesive  · Follow the above instructions for open or closed cuts. · Leave the skin adhesive on your skin until it falls off on its own. This may take 5 to 10 days. · Do not scratch, rub, or pick at the adhesive. · Do not put the sticky part of a bandage directly on the adhesive. · Do not put any kind of ointment, cream, or lotion over the area. This can make the adhesive fall off too soon. Do not use hydrogen peroxide or alcohol, which can slow healing. When should you call for help? Call your doctor now or seek immediate medical care if:    · You have new pain, or your pain gets worse.     · The skin near the cut is cold or pale or changes color.     · You have tingling, weakness, or numbness near the cut.     · The cut starts to bleed, and blood soaks through the bandage. Oozing small amounts of blood is normal.     · You have trouble moving the area near the cut.     · You have symptoms of infection, such as:  ? Increased pain, swelling, warmth, or redness around the cut.  ?  Red streaks leading from the cut.  ? Pus draining from the cut.  ? A fever.    Watch closely for changes in your health, and be sure to contact your doctor if:    · The cut reopens.     · You do not get better as expected. Where can you learn more? Go to http://ap-rudolph.info/. Enter M735 in the search box to learn more about \"Cuts: Care Instructions. \"  Current as of: June 26, 2019  Content Version: 12.2  © 5790-9536 [employer], [facility]. Care instructions adapted under license by [employer] (which disclaims liability or warranty for this information). If you have questions about a medical condition or this instruction, always ask your healthcare professional. [address] any warranty or liability for your use of this information.

## 2020-04-18 DIAGNOSIS — F41.9 ANXIETY: ICD-10-CM

## 2020-04-20 RX ORDER — PAROXETINE 30 MG/1
TABLET, FILM COATED ORAL
Qty: 90 TAB | Refills: 0 | Status: SHIPPED | OUTPATIENT
Start: 2020-04-20 | End: 2020-08-26 | Stop reason: SDUPTHER

## 2020-08-26 DIAGNOSIS — F41.9 ANXIETY: ICD-10-CM

## 2020-08-26 RX ORDER — PAROXETINE 30 MG/1
30 TABLET, FILM COATED ORAL DAILY
Qty: 90 TAB | Refills: 0 | Status: SHIPPED | OUTPATIENT
Start: 2020-08-26 | End: 2020-09-14 | Stop reason: SDUPTHER

## 2020-08-26 NOTE — TELEPHONE ENCOUNTER
Future Appointments:  No future appointments.      Last Appointment With Me:  Visit date not found     Requested Prescriptions     Pending Prescriptions Disp Refills    PARoxetine (PAXIL) 30 mg tablet 90 Tab 0

## 2020-08-26 NOTE — TELEPHONE ENCOUNTER
----- Message from Luanne Hamilton sent at 8/25/2020  5:35 PM EDT -----  Regarding: /Telephone  Medication Refill    Caller (if not patient):Pt       Relationship of caller (if not patient):Pt       Best contact number(s):[phone]      Name of medication and dosage if known:Paroxetine 30mg      Is patient out of this medication (yes/no): No      Pharmacy name:[facility] pharmacy     Pharmacy listed in chart? (yes/no):Yes  Pharmacy phone [phone]      Message copied/pasted from [facility] HEMATOLOGIC - ADULT    • Maintains hematologic stability Progressing        Impaired Activities of Daily Living    • Achieve highest/safest level of independence in self care Progressing        Impaired Functional Mobility    • Achieve highest/safest level

## 2020-09-14 DIAGNOSIS — F41.9 ANXIETY: ICD-10-CM

## 2020-09-14 RX ORDER — PAROXETINE 30 MG/1
30 TABLET, FILM COATED ORAL DAILY
Qty: 90 TAB | Refills: 0 | Status: SHIPPED | OUTPATIENT
Start: 2020-09-14 | End: 2021-01-25

## 2020-12-15 DIAGNOSIS — N94.6 MENSES PAINFUL: ICD-10-CM

## 2020-12-15 RX ORDER — NORETHINDRONE ACETATE AND ETHINYL ESTRADIOL 1; 20 MG/1; UG/1
TABLET ORAL
Qty: 21 DOSE PACK | Refills: 3 | Status: SHIPPED | OUTPATIENT
Start: 2020-12-15 | End: 2020-12-23 | Stop reason: SDUPTHER

## 2020-12-15 NOTE — TELEPHONE ENCOUNTER
----- Message from Serenity Heard sent at 12/15/2020  7:37 AM EST -----  Regarding: Dr Kyara Pozo (if not patient):  PT  Relationship of caller (if not patient): self   Best contact number(s): [phone]  Name of medication and dosage if known: Billychintan Sanders 1/20, 21, 1-20 mg-mcg tab  Is patient out of this medication (yes/no):  yes  Pharmacy name:  [facility]   Pharmacy listed in chart? (yes/no):yes   Pharmacy phone number: [phone]  Date of last visit:  02/25/2020  Details to clarify the request: Patient is completely out of meds      Message from [facility]

## 2020-12-16 ENCOUNTER — TELEPHONE (OUTPATIENT)
Dept: INTERNAL MEDICINE CLINIC | Age: 20
End: 2020-12-16

## 2020-12-16 NOTE — TELEPHONE ENCOUNTER
----- Message from Dimple Sheppard sent at 12/16/2020 10:51 AM EST -----  Regarding: Dr Justine Jama (if not patient): n/a      Relationship of caller (if not patient): n/a      Best contact number(s):[phone]      Name of medication and dosage if known:Henry      Is patient out of this medication (yes/no): yes      Pharmacy name: [facility]    Pharmacy listed in chart? (yes/no): yes  Pharmacy phone number:n/a      Details to clarify the request: Pharmacy is waiting on authorization from doctor. Pt is out of medication for three days.       Message from [facility]

## 2020-12-23 ENCOUNTER — TELEPHONE (OUTPATIENT)
Dept: INTERNAL MEDICINE CLINIC | Age: 20
End: 2020-12-23

## 2020-12-23 DIAGNOSIS — N94.6 MENSES PAINFUL: ICD-10-CM

## 2020-12-23 RX ORDER — NORETHINDRONE ACETATE AND ETHINYL ESTRADIOL 1; 20 MG/1; UG/1
TABLET ORAL
Qty: 21 DOSE PACK | Refills: 3 | Status: SHIPPED | OUTPATIENT
Start: 2020-12-23

## 2020-12-23 NOTE — TELEPHONE ENCOUNTER
----- Message from Joelle Starr sent at 12/22/2020  5:12 PM EST -----  Regarding: Dr. Nikko Del Valle first and last name: Monika Willis  Reason for call: Birth Control  Callback required yes/no and why: Yes  Best contact number(s): [phone]  Details to clarify the request: Pt wants to know if she can still have her medication refills for her birthcontrol without being seen by the doctor, due to her not having insurance.     Copy/paste [employer]

## 2020-12-23 NOTE — TELEPHONE ENCOUNTER
MD Patsy Delaney, JOSE CRUZ   Caller: Unspecified (Today,  7:22 AM)             Correction - prescription sent for birth control I see patient has appointment in January      Left message for patient to return call

## 2021-01-05 ENCOUNTER — TELEPHONE (OUTPATIENT)
Dept: INTERNAL MEDICINE CLINIC | Age: 21
End: 2021-01-05

## 2021-01-05 NOTE — TELEPHONE ENCOUNTER
Kai Lira. [place] [address] Office Pool             Caller's first and last name:   Reason for call: cancelling refill request   Callback required yes/no and why: no, unless questions   Best contact number(s): [phone]   Details to clarify the request: Patient is wanting to cancel her refill request for her birth control. She was needing to make an appointment before her refill but she doesn't have insurance.

## 2021-01-25 DIAGNOSIS — F41.9 ANXIETY: ICD-10-CM

## 2021-01-25 RX ORDER — PAROXETINE 30 MG/1
TABLET, FILM COATED ORAL
Qty: 90 TAB | Refills: 0 | Status: SHIPPED | OUTPATIENT
Start: 2021-01-25

## 2021-07-21 DIAGNOSIS — F41.9 ANXIETY: ICD-10-CM

## 2021-07-21 RX ORDER — PAROXETINE 30 MG/1
TABLET, FILM COATED ORAL
Qty: 90 TABLET | Refills: 0 | OUTPATIENT
Start: 2021-07-21

## 2021-07-21 NOTE — TELEPHONE ENCOUNTER
Future Appointments:  No future appointments.      Last Appointment With Me:  Visit date not found     Requested Prescriptions     Pending Prescriptions Disp Refills    PARoxetine (PAXIL) 30 mg tablet 90 Tablet 0     Sig: TAKE 1 TABLET BY MOUTH EVERY DAY
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
## 2022-03-18 PROBLEM — F60.3 BORDERLINE PERSONALITY DISORDER (HCC): Status: ACTIVE | Noted: 2017-08-01

## 2022-03-19 PROBLEM — F32.9 MDD (MAJOR DEPRESSIVE DISORDER): Status: ACTIVE | Noted: 2017-07-31

## 2022-03-19 PROBLEM — F43.21 ADJUSTMENT DISORDER WITH DEPRESSED MOOD: Status: ACTIVE | Noted: 2017-08-01

## 2022-03-20 PROBLEM — F17.200 TOBACCO DEPENDENCE: Status: ACTIVE | Noted: 2017-08-01

## 2022-03-20 PROBLEM — F10.929 ALCOHOLIC INTOXICATION WITH COMPLICATION (HCC): Status: ACTIVE | Noted: 2017-08-01

## 2023-05-12 RX ORDER — PAROXETINE 30 MG/1
1 TABLET, FILM COATED ORAL DAILY
COMMUNITY
Start: 2021-01-25

## 2023-05-12 RX ORDER — NORETHINDRONE ACETATE AND ETHINYL ESTRADIOL 1; .02 MG/1; MG/1
TABLET ORAL
COMMUNITY
Start: 2020-12-23

## 2023-09-26 NOTE — ED PROVIDER NOTES
HPI Comments: Michael Martinez is a 15 y.o. female with hx significant for depression who presents ambulatory to ED [facility] ED accompanied by her mother for evaluation of suicidal ideations and suicide attempt today. Pt reports that she experienced a break up today which upset her. Pt then began to have suicidal ideations and cut herself twice on the L forearm in an attempt to hurt herself. Pt states this is not the first time she has cut herself. Per mother, pt's depression is managed by her PCP and she takes Zoloft. Pt reports she takes them as directed and has not missed any doses. Pt denies taking any non-prescribed drugs or overdosing on any medications. At this time in the ED pt states she is unsure if she feels suicidal or not. Pt reports she is R hand dominant. Per mother, pt's last TDAP was greater than 5 years ago, but pt's immunizations are otherwise UTD. Pt denies h/o DM, kidney disease, liver disease, or thyroid disease. Pt denies homicidal ideations, nausea, vomiting, diarrhea, CP, or SOB. PCP: Donn Talley MD    Social Hx: +tobacco (0.25 ppd), +EtOH (socially), -illicit drug usage    There are no other complaints, changes or physical findings at this time. The history is provided by the patient and the mother. Pediatric Social History:         History reviewed. No pertinent past medical history. History reviewed. No pertinent surgical history. Family History:   Problem Relation Age of Onset    Diabetes Maternal Grandmother     Heart Disease Maternal Grandmother     Heart Disease Maternal Grandfather        Social History     Social History    Marital status: SINGLE     Spouse name: N/A    Number of children: N/A    Years of education: N/A     Occupational History    Not on file.      Social History Main Topics    Smoking status: Current Some Day Smoker     Packs/day: 0.25     Last attempt to quit: 6/26/2015    Smokeless tobacco: Never Used    Alcohol use Yes      Comment: Pt just letting you know about his Thursday appointment. social    Drug use: No    Sexual activity: Yes     Partners: Male     Birth control/ protection: Pill     Other Topics Concern    Not on file     Social History Narrative         ALLERGIES: Review of patient's allergies indicates no known allergies. Review of Systems   Constitutional: Negative. Negative for fever. HENT: Negative. Eyes: Negative. Respiratory: Negative. Negative for shortness of breath. Cardiovascular: Negative. Negative for chest pain. Gastrointestinal: Negative. Negative for constipation, diarrhea, nausea and vomiting. Denies liver disease   Endocrine:        Denies thyroid disease   Genitourinary: Negative. Negative for dysuria. Denies kidney disease   Musculoskeletal: Negative. Skin: Positive for wound (lacerations to L forearm). Neurological: Negative. Psychiatric/Behavioral: Positive for self-injury and suicidal ideas. All other systems reviewed and are negative. Vitals:    04/27/17 1600 04/27/17 1604   BP:  130/85   Pulse:  99   Resp:  16   Temp:  98.2 °F (36.8 °C)   SpO2:  99%   Weight: 70.3 kg    Height: 175.3 cm             Physical Exam   Constitutional: She is oriented to person, place, and time. She appears well-developed and well-nourished. No distress. HENT:   Head: Normocephalic and atraumatic. Right Ear: External ear normal.   Left Ear: External ear normal.   Nose: Nose normal.   Mouth/Throat: Oropharynx is clear and moist. No oropharyngeal exudate. Eyes: Conjunctivae and EOM are normal. Pupils are equal, round, and reactive to light. Right eye exhibits no discharge. Left eye exhibits no discharge. No scleral icterus. Dilated pupils (pt states is baseline)   Neck: Normal range of motion. Neck supple. No tracheal deviation present. Cardiovascular: Normal rate, regular rhythm, normal heart sounds and intact distal pulses. Exam reveals no gallop and no friction rub. No murmur heard.   Pulmonary/Chest: Effort normal and breath sounds normal. No respiratory distress. She has no wheezes. She has no rales. She exhibits no tenderness. Abdominal: Soft. Bowel sounds are normal. She exhibits no distension and no mass. There is no tenderness. There is no rebound and no guarding. Musculoskeletal: She exhibits no edema or tenderness. Lymphadenopathy:     She has no cervical adenopathy. Neurological: She is alert and oriented to person, place, and time. No cranial nerve deficit. NVI distally   Skin: Skin is warm and dry. No rash noted. No erythema. 2 linear lacerations horizontal across the L forearm, no active bleeding   Psychiatric: She has a normal mood and affect. Her behavior is normal.   Nursing note and vitals reviewed. MDM  Number of Diagnoses or Management Options  Diagnosis management comments: DDx: depression, suicidal ideation, acute stress reaction, unspecified depressive disorder       Amount and/or Complexity of Data Reviewed  Obtain history from someone other than the patient: yes (Mother)  Review and summarize past medical records: yes  Discuss the patient with other providers: yes [facility])    Patient Progress  Patient progress: stable    ED Course       Procedures      PROGRESS NOTE:  4:56 PM  Discussed pt with Katerin Rasheed MD. She agrees with plan of care. Written by Thelma Kay ED Scribe, as dictated by [employer]. CONSULT NOTE:   4:58 PM  CHIVO Zamora spoke with Marium Gay,   Specialty: [facility]  Discussed pt's hx, disposition, and available diagnostic and imaging results. Reviewed care plans. Consultant agrees with plans as outlined. Hollis Lopez will come see/evaluate pt in the ED. Written by Chance Sauer ED Scribe, as dictated by [employer]. PROGRESS NOTES:  5:30 PM  Marium Gay currently in the ED to see/evaluate pt in the ED. Written by Thelma Kay ED Scribe, as dictated by [employer]. 5:59 PM  Marium Gay has seen/evaluated pt.  He will provide several resources for pt. Written by EMRE Hankins, as dictated by [employer]. Procedure Note - Laceration Repair:  7:12 PM  Procedure by CHIVO Ramirez. Complexity: Simple  3.5 cm linear laceration to distal L forearm  was irrigated copiously with NS under jet lavage, prepped with shur clens and saline and draped in a sterile fashion. The area was anesthetized with 3 mLs of  Lidocaine 2% with epinephrine via local infiltration. The wound was explored with the following results: No foreign bodies found. The wound was repaired with One layer suture closure: Skin Layer:  11 sutures placed, stitch type:running locked, suture: 4-0 nylon. .  The wound was closed with good hemostasis and approximation. Sterile dressing applied. Estimated blood loss: 2 mL  The procedure took 1-15 minutes, and pt tolerated well. Written by EMRE Zhang, as dictated by [employer]. Procedure Note - Laceration Repair:  7:12 PM  Procedure by CHIVO Ramirez. Complexity: Simple  2.7 cm linear laceration to proximal L forearm  was irrigated copiously with NS under jet lavage, prepped with shur clens and saline and draped in a sterile fashion. The area was anesthetized with 2 mLs of  Lidocaine 2% with epinephrine via local infiltration. The wound was explored with the following results: No foreign bodies found. The wound was repaired with One layer suture closure: Skin Layer:  8 sutures placed, stitch type:running locked, suture: 4-0 nylon. .  The wound was closed with good hemostasis and approximation. Sterile dressing applied. Estimated blood loss: 2 mL  The procedure took 1-15 minutes, and pt tolerated well. Written by EMRE Zhang, as dictated by [employer].           MEDICATIONS GIVEN:  Medications   neomycin-bacitracnZn-polymyxnB (NEOSPORIN) ointment 1 Packet (1 Packet Topical Given by Provider 4/27/17 1700)   lidocaine (PF) (XYLOCAINE) 10 mg/mL (1 %) injection 5 mL (5 mL SubCUTAneous Given by Provider 4/27/17 1700)   diph,Pertuss(AC),Tet Vac-PF (BOOSTRIX) suspension 0.5 mL (0.5 mL IntraMUSCular Given 4/27/17 1935)   acetaminophen (TYLENOL) tablet 650 mg (650 mg Oral Given 4/27/17 1708)       IMPRESSION:  1. Other depression    2. Suicide attempt ([address]    3. Lacerations of multiple sites of left arm, initial encounter        PLAN:  Current Discharge Medication List      START taking these medications    Details   ibuprofen (MOTRIN) 600 mg tablet Take 1 Tab by mouth every six (6) hours as needed for Pain. Qty: 20 Tab, Refills: 0      neomycin-bacitracin-polymyxin (NEOSPORIN, LGF-MGY-VQYPG,) 3.5mg-400 unit- 5,000 unit/gram ointment Apply  to affected area three (3) times daily for 10 days. Apply to affected area  Qty: 1 Tube, Refills: 0           Follow-up Information     Follow up With Details Comments Contact Info    [facility]   [facility]   [address] [place] [address]. [phone]    [facility] [address] [address] [place] [address], [address] [place] [address]  [phone]      [facility] EMERGENCY DEPT  If symptoms worsen [address] [place] [address]  [phone]        Return to ED if worse     DISCHARGE NOTE:  7:47 PM  Pt has been reexamined. Pt and pt's parent/guardian has no new complaints, changes, or physical findings. Care plan outlined and precautions discussed. All available results reviewed with pt and pt's parent/guardian. All medications reviewed with pt and pt's parent/guardian. All of pt and pts parent/guardian questions and concerns addressed. Pt's family agrees to f/u with pt as instructed and agrees to return to ED upon further deterioration. Pt is ready to go home. ATTESTATION:  This note is prepared by  , acting as Scribe for [employer]. CHIVO Ramirez and personally reviewed by me in its entirety.  I confirm that the note above accurately reflects all work, treatment, procedures, and medical decision making performed by me.